# Patient Record
Sex: FEMALE | Race: WHITE
[De-identification: names, ages, dates, MRNs, and addresses within clinical notes are randomized per-mention and may not be internally consistent; named-entity substitution may affect disease eponyms.]

---

## 2018-03-20 LAB
BASOPHILS # BLD: 0.03 K/UL (ref 0–0.2)
BASOPHILS NFR BLD: 0.4 %
BUN SERPL-MCNC: 14 MG/DL (ref 7–18)
CALCIUM SERPL-MCNC: 10.2 MG/DL (ref 8.5–10.1)
CO2 SERPL-SCNC: 28 MMOL/L (ref 21–32)
CREAT SERPL-MCNC: 0.72 MG/DL (ref 0.6–1.2)
EOS ABS #: 0.06 K/UL (ref 0–0.5)
EOSINOPHIL NFR BLD AUTO: 288 K/UL (ref 130–400)
GLUCOSE SERPL-MCNC: 84 MG/DL (ref 70–99)
HCT VFR BLD CALC: 44.5 % (ref 37–47)
HGB BLD-MCNC: 15.1 G/DL (ref 12–16)
IG#: 0.03 K/UL (ref 0–0.02)
IMM GRANULOCYTES NFR BLD AUTO: 33.4 %
LYMPHOCYTES # BLD: 2.55 K/UL (ref 1.2–3.4)
MCH RBC QN AUTO: 30 PG (ref 25–34)
MCHC RBC AUTO-ENTMCNC: 33.9 G/DL (ref 32–36)
MCV RBC AUTO: 88.3 FL (ref 80–100)
MONO ABS #: 0.5 K/UL (ref 0.11–0.59)
MONOCYTES NFR BLD: 6.6 %
NEUT ABS #: 4.46 K/UL (ref 1.4–6.5)
NEUTROPHILS # BLD AUTO: 0.8 %
NEUTROPHILS NFR BLD AUTO: 58.4 %
PMV BLD AUTO: 8.8 FL (ref 7.4–10.4)
POTASSIUM SERPL-SCNC: 4.7 MMOL/L (ref 3.5–5.1)
RED CELL DISTRIBUTION WIDTH CV: 13.8 % (ref 11.5–14.5)
RED CELL DISTRIBUTION WIDTH SD: 44.7 FL (ref 36.4–46.3)
SODIUM SERPL-SCNC: 138 MMOL/L (ref 136–145)
WBC # BLD AUTO: 7.63 K/UL (ref 4.8–10.8)

## 2018-03-29 ENCOUNTER — HOSPITAL ENCOUNTER (OUTPATIENT)
Dept: HOSPITAL 45 - X.SURG | Age: 58
Discharge: HOME | End: 2018-03-29
Attending: ORTHOPAEDIC SURGERY
Payer: COMMERCIAL

## 2018-03-29 VITALS — TEMPERATURE: 97.52 F

## 2018-03-29 VITALS — SYSTOLIC BLOOD PRESSURE: 155 MMHG | DIASTOLIC BLOOD PRESSURE: 72 MMHG

## 2018-03-29 VITALS — OXYGEN SATURATION: 98 % | HEART RATE: 66 BPM

## 2018-03-29 VITALS
BODY MASS INDEX: 29.08 KG/M2 | HEIGHT: 64.02 IN | HEIGHT: 64.02 IN | WEIGHT: 170.35 LBS | BODY MASS INDEX: 29.08 KG/M2 | WEIGHT: 170.35 LBS

## 2018-03-29 DIAGNOSIS — M94.261: ICD-10-CM

## 2018-03-29 DIAGNOSIS — Z82.49: ICD-10-CM

## 2018-03-29 DIAGNOSIS — Z88.2: ICD-10-CM

## 2018-03-29 DIAGNOSIS — Z80.3: ICD-10-CM

## 2018-03-29 DIAGNOSIS — Z90.89: ICD-10-CM
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
## 2018-03-29 DIAGNOSIS — Z83.3: ICD-10-CM

## 2018-03-29 DIAGNOSIS — S83.203A: Primary | ICD-10-CM

## 2018-03-29 DIAGNOSIS — Z91.040: ICD-10-CM

## 2018-03-29 DIAGNOSIS — Z83.2: ICD-10-CM

## 2018-03-29 NOTE — [facility] POST OPERATIVE BRIEF NOTE
Immediate Operative Summary


Operative Date


Mar 29, 2018.





Pre-Operative Diagnosis





Right Knee Medial Meniscal Tear, Chondromalacia





Post-Operative Diagnosis





same





Procedure(s) Performed





Right Knee Arthroscopy, Partial Medial Menisectomy, Chondroplasty





Surgeon


Dr. MIMI Limon





Assistant Surgeon(s)


SHANKAR Gross PA-C





Estimated Blood Loss


5 cc





Findings


Consistent with Post-Op Diagnosis





Specimens





None





Anesthesia Type


General





Complication(s)


none





Disposition


Disposition:  Recovery Room / PACU

## 2018-03-29 NOTE — DISCHARGE INSTRUCTIONS-[facility]
Discharge Instructions


Date of Service


Mar 29, 2018.





Visit


Reason for Visit:  Right Knee Joint Effusion, Pain





Discharge


Discharge Diagnosis / Problem:  SAME AS ABOVE





Discharge Goals


Goal(s):  Decrease discomfort, Improve function





Activity Recommendations


Activity Limitations:  as noted below


Lifting Limitations:  until after follow-up appointment


Exercise/Sports Limitations:  as tolerated


Shower/Bathe:  tomorrow





Anesthesia


.





Post Anesthesia Instructions:





If you have had General Anesthesia or IV Sedation:





*  Do not drive today.


*  Resume driving when surgeon permits.


*  Do not make important decisions or sign legal documents today.


*  Call surgeon for:





   1.  Temperature elevations greater than 101 degrees F.


   2.  Uncontrollable pain.


   3.  Excessive bleeding.


   4.  Persistent nausea and vomiting.


   5.  Medication intolerance (nausea, vomiting or rash).





*  For nausea and vomiting use only clear liquids such as: tea, soda, bouillon 

until nausea subsides, then gradually increase diet as tolerated.





*  If you have any concerns or questions, call your surgeon's office.  If 

physician is unavailable and it is an emergency, call 911 or go to the nearest 

emergency room.





.





Instructions / Follow-Up


Instructions / Follow-Up





MEDICATIONS:





*  Resume previous medications unless instructed otherwise by your surgeon.





*  Always take pain medication on a full stomach or with food to avoid upset 

stomach. 





*  Do not drink alcohol or drive while taking narcotics.





*  Ibuprofen or Tylenol may be taken if narcotic not needed.








SPECIAL CARE INSTRUCTIONS:





__ None





_X_ Keep extremity elevated and iced x 48 hours; apply ice 20-30


    minutes 8-10 times/day. May remove at night.





_X_ Crutches


    _X_ May discard when able





__ Brace/Post-op shoe


    __ 24 hrs/day


    __ Remove at night





_X_ Dressing


    __ Maintain until seen in office, may shower with plastic over site


    _X_ Remove dressings in 24-48 hours and then may shower


    _X_ Cover incisions with band-aids after showering


    __ Do not remove steri-strips





Call physician if chills or temperature rises above 102 degrees or


pain unrelieved by prescribed pain medications. 


Office [phone]





Diet Recommendations


Home Diet:  no limitations


Fluid Restriction:  None





Procedures


Procedures Performed:  


Right Knee Arthroscopy, Partial Medial Menisectomy, Chondroplasty





Pending Studies


Studies pending at discharge:  no





Work Instructions


Return To Work:  after follow-up





Medical Emergencies








.


Who to Call and When:





Medical Emergencies:  If at any time you feel your situation is an emergency, 

please call 911 immediately.





.





Non-Emergent Contact


Non-Emergency issues call your:  Primary Care Provider


Call Non-Emergent contact if:  you have a fever, temperature is above 101.5, 

your pain is not controlled





.


.








"Provider Documentation" section prepared by Frank Gross.








.

## 2018-03-29 NOTE — OPERATIVE REPORT
DATE OF OPERATION:  03/29/2018

 

PREOPERATIVE DIAGNOSES:  Medial meniscus tear and chondromalacia of the right

knee.

 

POSTOPERATIVE DIAGNOSES:  Same.

 

PROCEDURE:  Right knee arthroscopy with chondroplasty of the distal medial

femoral condyle and partial medial meniscectomy of a radial tear of the

posterior aspect of the meniscus.

 

SURGEON:  Dr. Brendan Limon.

 

ASSISTANT:  Vikas Gross PA-C, whose assistance was necessary for helping

position the leg and closure.

 

ANESTHESIA:  General.

 

COMPLICATION:  None.

 

CONDITION:  Stable to PACU.

 

INDICATIONS:  Sofya is a pleasant 57-year-old female who works for [employer].  After [place], her leg fell asleep, she got out of the

van, started walking, began noticing significant right knee pain.  She has

had knee pain since.  She has tried conservative treatment including

injections without any relief.  MRI and clinical examination showed some

chondromalacia and a radial tear of the medial meniscus.  She elected to

undergo arthroscopy.

 

OPERATION AND FINDINGS:  On 03/29/2018, she arrived at [facility] for the above procedure.  She was seen in the preoperative holding

area and the operative extremity was identified and signed.  She was given a

preoperative antibiotic, taken back to the operating room, laid on table in

supine position and put under general anesthesia.  The right knee was then

prepped and draped in sterile fashion.  Timeout was done.  The patient and

operative extremity was properly identified.

 

A scope was introduced in the lateral parapatellar portal.  Diagnostic

arthroscopy showed no loose bodies in the suprapatellar pouch.  There was no

cartilage damage within the trochlea or the patella.  The scope was then

brought into the medial compartment and medial parapatellar portal was made

under direct visualization.  Off the more posterior aspect of the distal

medial femoral condyle, there was a large flap of cartilage that was being

rubbed on the posterior meniscus.  A shaver was used to remove this unstable

piece of cartilage and completed chondroplasty of the distal medial femoral

condyle.  The meniscus was then easily visualized.  There was a radial tear

on the posterior aspect of the meniscus.  A shaver and ablator were used to

remove the flaps of the radial tear and bring the meniscus back to stable

margins.  Multiple pictures were taken.  The scope was then brought into the

trochlea.  ACL and PCL were intact.  Scope was brought into lateral

compartment.  There was no meniscus or cartilage damage laterally.  The scope

was then placed in the contralateral portal.  Repeat diagnostic arthroscopy

showed no additional pathology.  The knee was then irrigated and portal sites

were closed with 3-0 nylon.  The knee was then injected with 30 mL of Naropin

with epi and Toradol.  She was then placed in a soft compressive dressing,

extubated, transferred to a [facility] and taken to the postanesthesia care unit

in stable condition.  She tolerated the procedure well.

 

 

I attest to the content of the Intraoperative Record and any orders documented therein. Any exception
s are noted below.